# Patient Record
Sex: FEMALE | Race: BLACK OR AFRICAN AMERICAN | NOT HISPANIC OR LATINO | Employment: OTHER | URBAN - METROPOLITAN AREA
[De-identification: names, ages, dates, MRNs, and addresses within clinical notes are randomized per-mention and may not be internally consistent; named-entity substitution may affect disease eponyms.]

---

## 2011-07-05 LAB — HUMAN PAPILLOMAVIRUS (HPV): NORMAL

## 2019-03-03 PROBLEM — E55.9 VITAMIN D DEFICIENCY: Status: ACTIVE | Noted: 2019-03-03

## 2019-03-03 PROBLEM — I10 ESSENTIAL HYPERTENSION: Status: ACTIVE | Noted: 2019-03-03

## 2019-03-03 PROBLEM — H40.9 GLAUCOMA: Status: ACTIVE | Noted: 2019-03-03

## 2019-03-04 ENCOUNTER — OFFICE VISIT (OUTPATIENT)
Dept: FAMILY MEDICINE | Facility: CLINIC | Age: 49
End: 2019-03-04
Payer: MEDICARE

## 2019-03-04 VITALS
TEMPERATURE: 97 F | DIASTOLIC BLOOD PRESSURE: 81 MMHG | HEIGHT: 64 IN | HEART RATE: 74 BPM | OXYGEN SATURATION: 98 % | SYSTOLIC BLOOD PRESSURE: 118 MMHG | BODY MASS INDEX: 38.72 KG/M2 | WEIGHT: 226.81 LBS

## 2019-03-04 DIAGNOSIS — M25.561 CHRONIC PAIN OF RIGHT KNEE: Primary | ICD-10-CM

## 2019-03-04 DIAGNOSIS — H20.823 VKH (VOGT-KOYANAGI-HARADA SYNDROME), BILATERAL: ICD-10-CM

## 2019-03-04 DIAGNOSIS — I10 ESSENTIAL HYPERTENSION: ICD-10-CM

## 2019-03-04 DIAGNOSIS — Q15.9 GLAUCOMA ASSOCIATED WITH CHAMBER ANGLE ANOMALIES, BILATERAL, SEVERE STAGE: ICD-10-CM

## 2019-03-04 DIAGNOSIS — G89.29 CHRONIC PAIN OF RIGHT KNEE: Primary | ICD-10-CM

## 2019-03-04 DIAGNOSIS — R49.0 CHRONIC HOARSENESS: ICD-10-CM

## 2019-03-04 DIAGNOSIS — J30.2 SEASONAL ALLERGIC RHINITIS, UNSPECIFIED TRIGGER: ICD-10-CM

## 2019-03-04 DIAGNOSIS — E55.9 VITAMIN D DEFICIENCY: ICD-10-CM

## 2019-03-04 DIAGNOSIS — H40.53X3 GLAUCOMA ASSOCIATED WITH CHAMBER ANGLE ANOMALIES, BILATERAL, SEVERE STAGE: ICD-10-CM

## 2019-03-04 DIAGNOSIS — Z12.31 SCREENING MAMMOGRAM, ENCOUNTER FOR: ICD-10-CM

## 2019-03-04 PROBLEM — H26.9 CATARACTS, BOTH EYES: Status: ACTIVE | Noted: 2019-03-04

## 2019-03-04 PROCEDURE — 99214 OFFICE O/P EST MOD 30 MIN: CPT | Mod: S$GLB,,, | Performed by: FAMILY MEDICINE

## 2019-03-04 PROCEDURE — 99214 PR OFFICE/OUTPT VISIT, EST, LEVL IV, 30-39 MIN: ICD-10-PCS | Mod: S$GLB,,, | Performed by: FAMILY MEDICINE

## 2019-03-04 RX ORDER — LOSARTAN POTASSIUM 100 MG/1
TABLET ORAL
Refills: 5 | COMMUNITY
Start: 2019-02-15 | End: 2019-06-24 | Stop reason: SDUPTHER

## 2019-03-04 RX ORDER — ERGOCALCIFEROL 1.25 MG/1
CAPSULE ORAL
Refills: 1 | COMMUNITY
Start: 2019-02-15 | End: 2019-03-20 | Stop reason: SDUPTHER

## 2019-03-04 RX ORDER — PREDNISOLONE ACETATE 10 MG/ML
SUSPENSION/ DROPS OPHTHALMIC
Refills: 3 | COMMUNITY
Start: 2019-02-15

## 2019-03-04 RX ORDER — BRIMONIDINE TARTRATE, TIMOLOL MALEATE 2; 5 MG/ML; MG/ML
SOLUTION/ DROPS OPHTHALMIC
Refills: 5 | COMMUNITY
Start: 2019-02-15

## 2019-03-04 NOTE — PROGRESS NOTES
Subjective:       Patient ID: Olga Hartley is a 48 y.o. female.    Chief Complaint: Knee Pain (Right   )    47 yo female with complaint of sore throat and chronic hoearseness that has been ongoing for about 6 months She takes OTC cold meds when she feels like she has a cold.  She does not like to take too many meds because of her vision which is impaired.  She is legally blind and a lot of medications causes her vision to worsen temporarily. She sings a lot and notices that she has been hoarse for several months.  She also complains of right knee pain for 3 months with no known injury or trauma.  She has been taking ibuprofen which helps some but she has difficulty walking on it at times.      Knee Pain    The incident occurred more than 1 week ago. There was no injury mechanism. The pain is present in the right knee. The quality of the pain is described as stabbing and burning. The pain is at a severity of 6/10. The pain is moderate. The pain has been constant since onset. Pertinent negatives include no inability to bear weight, loss of sensation, numbness or tingling. She reports no foreign bodies present. Nothing aggravates the symptoms. She has tried NSAIDs, non-weight bearing and rest for the symptoms. The treatment provided mild relief.   Sinus Problem   This is a chronic problem. The problem has been waxing and waning since onset. There has been no fever. Her pain is at a severity of 0/10. She is experiencing no pain. Associated symptoms include a hoarse voice, sinus pressure, sneezing and a sore throat. Pertinent negatives include no chills, coughing, diaphoresis, ear pain, headaches, neck pain or shortness of breath. Treatments tried: takes Tamara-Seltxer Cold Plus prn.     Review of Systems   Constitutional: Negative for chills, diaphoresis and fever.   HENT: Positive for hoarse voice, sinus pressure, sneezing and sore throat. Negative for ear pain, postnasal drip, rhinorrhea and sinus pain.    Eyes:  Negative for redness.   Respiratory: Negative for cough, chest tightness, shortness of breath and wheezing.    Cardiovascular: Negative for chest pain, palpitations and leg swelling.   Gastrointestinal: Negative for constipation and nausea.   Genitourinary: Negative for difficulty urinating and dysuria.   Musculoskeletal: Positive for arthralgias. Negative for neck pain.   Skin: Negative for rash.   Neurological: Negative for dizziness, tingling, numbness and headaches.       Objective:      Physical Exam   Constitutional: She is oriented to person, place, and time. She appears well-developed and well-nourished.   HENT:   Head: Normocephalic and atraumatic.   Right Ear: Hearing and tympanic membrane normal.   Left Ear: Hearing and tympanic membrane normal.   Nose: Sinus tenderness present. Right sinus exhibits maxillary sinus tenderness. Left sinus exhibits maxillary sinus tenderness.   Mouth/Throat: Oropharynx is clear and moist and mucous membranes are normal.   Eyes: Conjunctivae and EOM are normal. Pupils are equal, round, and reactive to light.       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 Neck supple.   Cardiovascular: Normal rate, regular rhythm and normal heart sounds.   Pulmonary/Chest: Breath sounds normal. She has no wheezes. She has no rales.   Abdominal: Soft. Bowel sounds are normal. She exhibits no distension and no mass. There is no tenderness. There is no guarding.   Musculoskeletal: Normal range of motion. She exhibits no edema or tenderness.   Neurological: She is alert and oriented to person, place, and time. No cranial nerve deficit.   Skin: Skin is warm and dry. No rash noted. No erythema.   Psychiatric: She has a normal mood and affect. Her behavior is normal.       Assessment:       1. Chronic pain of right knee    2. Essential hypertension    3. Vitamin D deficiency    4. VKH (Vogt-Koyanagi-Harada syndrome), bilateral    5. Chronic hoarseness     6. Seasonal allergic rhinitis, unspecified trigger    7. Glaucoma associated with chamber angle anomalies, bilateral, severe stage    8. Screening mammogram, encounter for        Plan:     patient desires a referral to Dr. Cuevas since she has seen him in the past for her knee.  She will be referred to the ENT for chronic sinus and allergies and hoarseness.  She will try an antihistamine OTC but does not want any scripts at this time for concern of making her eyes worse. She will have an order for labs and a mammogram.

## 2019-03-20 RX ORDER — ERGOCALCIFEROL 1.25 MG/1
CAPSULE ORAL
Qty: 12 CAPSULE | Refills: 3 | Status: SHIPPED | OUTPATIENT
Start: 2019-03-20

## 2019-04-08 LAB
CHOLEST SERPL-MSCNC: 179 MG/DL (ref 0–200)
HDLC SERPL-MCNC: 49 MG/DL (ref 35–70)
LDL-C: 106
LDL/HDL RATIO: 3.7 (ref 1–5)
TRIGL SERPL-MCNC: 119 MG/DL (ref 0–150)

## 2019-06-24 RX ORDER — LOSARTAN POTASSIUM 100 MG/1
100 TABLET ORAL DAILY
Qty: 30 TABLET | Refills: 11 | Status: SHIPPED | OUTPATIENT
Start: 2019-06-24

## 2019-10-01 ENCOUNTER — OFFICE VISIT (OUTPATIENT)
Dept: FAMILY MEDICINE | Facility: CLINIC | Age: 49
End: 2019-10-01
Payer: MEDICARE

## 2019-10-01 VITALS
BODY MASS INDEX: 38.24 KG/M2 | DIASTOLIC BLOOD PRESSURE: 89 MMHG | HEART RATE: 84 BPM | OXYGEN SATURATION: 99 % | TEMPERATURE: 99 F | HEIGHT: 64 IN | WEIGHT: 224 LBS | SYSTOLIC BLOOD PRESSURE: 148 MMHG

## 2019-10-01 DIAGNOSIS — I10 ESSENTIAL HYPERTENSION: Primary | ICD-10-CM

## 2019-10-01 DIAGNOSIS — R10.10 PAIN OF UPPER ABDOMEN: ICD-10-CM

## 2019-10-01 PROCEDURE — 99214 PR OFFICE/OUTPT VISIT, EST, LEVL IV, 30-39 MIN: ICD-10-PCS | Mod: S$GLB,,, | Performed by: FAMILY MEDICINE

## 2019-10-01 PROCEDURE — 99214 OFFICE O/P EST MOD 30 MIN: CPT | Mod: S$GLB,,, | Performed by: FAMILY MEDICINE

## 2019-10-01 NOTE — ASSESSMENT & PLAN NOTE
Patient has been encouraged to check her blood pressure regularly and keep taking her current losartan however if her blood pressure is staying elevated that she needs to notify me so we can change her blood pressure medication.

## 2019-10-01 NOTE — PROGRESS NOTES
Subjective:      Patient ID: Olga Hartley is a 49 y.o. female.    Chief Complaint: Follow-up (sick visit)    49-year-old female in for follow-up.  Patient with complaint of abdominal pain nausea increased burping and belching I have been ongoing for at least 2-3 months.  She reports that has been progressively getting worse.  When she starts to eat she gets full and not able to eat as much.  She feels bloated and pain that goes from her stomach to the right side.    She also feels that her blood pressure has been elevated because she has not been feeling well due to her abdominal pain in stomach issues.    Hypertension: Patient here for follow-up of elevated blood pressure. She is not exercising and is adherent to low salt diet.  Blood pressure is not well controlled at home. Cardiac symptoms none. Patient denies claudication, dyspnea, fatigue and palpitations.  Cardiovascular risk factors: hypertension and obesity (BMI >= 30 kg/m2). Use of agents associated with hypertension: none. History of target organ damage: none.    Abdominal Pain   This is a new problem. The current episode started more than 1 month ago. The onset quality is gradual. The problem occurs daily. The problem has been gradually worsening. The pain is located in the epigastric region and RUQ. The pain is at a severity of 5/10. The pain is moderate. The quality of the pain is colicky. The abdominal pain radiates to the right flank. Associated symptoms include belching, nausea and vomiting. Pertinent negatives include no anorexia, arthralgias, constipation, diarrhea, dysuria, fever, flatus, frequency, headaches, hematochezia, hematuria, melena, myalgias or weight loss. The pain is aggravated by belching and eating. The pain is relieved by nothing. She has tried nothing for the symptoms.     Review of Systems   Constitutional: Negative for fever and weight loss.   Gastrointestinal: Positive for abdominal pain, nausea and vomiting. Negative for  "anorexia, constipation, diarrhea, flatus, hematochezia and melena.   Genitourinary: Negative for dysuria, frequency and hematuria.   Musculoskeletal: Negative for arthralgias and myalgias.   Neurological: Negative for headaches.     Medication List with Changes/Refills   Current Medications    COMBIGAN 0.2-0.5 % DROP    PUT  1 DROP IN OU BID    ERGOCALCIFEROL (ERGOCALCIFEROL) 50,000 UNIT CAP    TK 1 C PO Q WK FOR 84 DAYS    LOSARTAN (COZAAR) 100 MG TABLET    Take 1 tablet (100 mg total) by mouth once daily.    PRED FORTE 1 % DRPS    INT 1 GTT IN OD BID      Objective:   BP (!) 148/89 (BP Location: Left arm, Patient Position: Sitting, BP Method: Medium (Automatic))   Pulse 84   Temp 98.5 °F (36.9 °C)   Ht 5' 4" (1.626 m)   Wt 101.6 kg (224 lb)   SpO2 99%   BMI 38.45 kg/m²    Estimated body mass index is 38.45 kg/m² as calculated from the following:    Height as of this encounter: 5' 4" (1.626 m).    Weight as of this encounter: 101.6 kg (224 lb).   Physical Exam  No results found for: WBC, HGB, HCT, PLT, CHOL, TRIG, HDL, LDLDIRECT, ALT, AST, NA, K, CL, CREATININE, BUN, CO2, TSH, PSA, INR, GLUF, HGBA1C, MICROALBUR   Assessment:      Problem List Items Addressed This Visit        Cardiac/Vascular    Essential hypertension - Primary    Relevant Orders    Comprehensive metabolic panel    CBC auto differential    Lipid panel    TSH    Urinalysis, Reflex to Urine Culture Urine, Clean Catch      Other Visit Diagnoses     Pain of upper abdomen        Relevant Orders    Urinalysis, Reflex to Urine Culture Urine, Clean Catch           Plan:     Patient will be given an order for fasting labs and an upper GI series and an abdominal ultrasound for further evaluation.  I will notify her of her test results once I receive them and we will go from there.      "

## 2019-10-01 NOTE — ASSESSMENT & PLAN NOTE
Patient was adamant about not taking any medication for her symptoms but wanted to to have test ran to find out what is causing her symptoms.  I have ordered an upper GI series and of abdominal ultrasound for further evaluation.  She also was given order for fasting labs as well.

## 2019-10-07 DIAGNOSIS — R16.0 LIVER MASSES: Primary | ICD-10-CM

## 2019-10-08 ENCOUNTER — TELEPHONE (OUTPATIENT)
Dept: FAMILY MEDICINE | Facility: CLINIC | Age: 49
End: 2019-10-08

## 2019-10-08 NOTE — TELEPHONE ENCOUNTER
----- Message from Clair Levin sent at 10/8/2019  4:15 PM CDT -----  Contact: patient  Calling for her CT results

## 2019-10-09 DIAGNOSIS — R16.0 LIVER MASSES: Primary | ICD-10-CM

## 2019-10-21 DIAGNOSIS — C79.9 METASTATIC ADENOCARCINOMA: ICD-10-CM

## 2019-10-21 DIAGNOSIS — R10.10 PAIN OF UPPER ABDOMEN: Primary | ICD-10-CM

## 2020-02-20 ENCOUNTER — TELEPHONE (OUTPATIENT)
Dept: FAMILY MEDICINE | Facility: CLINIC | Age: 50
End: 2020-02-20

## 2020-02-20 DIAGNOSIS — C22.1 CHOLANGIOCARCINOMA: Primary | ICD-10-CM

## 2020-02-20 RX ORDER — HYDROCODONE BITARTRATE AND ACETAMINOPHEN 10; 325 MG/1; MG/1
1 TABLET ORAL EVERY 12 HOURS PRN
Qty: 60 TABLET | Refills: 0 | Status: SHIPPED | OUTPATIENT
Start: 2020-02-20 | End: 2020-03-27 | Stop reason: SDUPTHER

## 2020-02-20 NOTE — TELEPHONE ENCOUNTER
MAY BENAVIDES. FROM [facility] IS REQUESTING A CALL BACK FROM YOU. STATES THAT THE PATIENT IS IN A LOT OF PAIN BUT [place] WILL NOT FILL A CONTROLLED SCRIPT FOR PAIN FROM ANOTHER STATE. WOULD LIKE A CALL BACK TO DISCUSS GETTING THE PATIENT A PAIN MANAGEMENT REFERRAL AND/OR A PRESCRIPTION FOR MEDICATION.

## 2020-02-20 NOTE — TELEPHONE ENCOUNTER
Received a phone call from the physician assistant at [facility] who is taking care of this patient.  She has been diagnosed with cholangiocarcinoma with metastasis and has been with increasing pain in the upper abdomen and back and they have been given her tramadol but has not been helping.  I was asked to prescribe her stronger pain medication if possible due to their in [place] and [place] is not allowed to take prescription of narcotic across the state line.  I did speak with patient and she explained to me that the tramadol they have been prescribing is not touching the pain and she is not able to get any sleep and would like to try a stronger pain medication.  I called in hydrocodone 10 mg tablets to be taken every 12 hr or as needed for pain since patient does have a cancer diagnosis.  She will be given a total of 60 tablets with no refill.

## 2020-03-02 ENCOUNTER — TELEPHONE (OUTPATIENT)
Dept: FAMILY MEDICINE | Facility: CLINIC | Age: 50
End: 2020-03-02

## 2020-03-06 ENCOUNTER — OFFICE VISIT (OUTPATIENT)
Dept: FAMILY MEDICINE | Facility: CLINIC | Age: 50
End: 2020-03-06
Payer: MEDICARE

## 2020-03-06 VITALS
DIASTOLIC BLOOD PRESSURE: 91 MMHG | SYSTOLIC BLOOD PRESSURE: 135 MMHG | HEART RATE: 109 BPM | OXYGEN SATURATION: 97 % | HEIGHT: 64 IN | WEIGHT: 193.25 LBS | BODY MASS INDEX: 32.99 KG/M2 | TEMPERATURE: 96 F

## 2020-03-06 DIAGNOSIS — C22.1 CHOLANGIOCARCINOMA: Primary | ICD-10-CM

## 2020-03-06 DIAGNOSIS — I10 ESSENTIAL HYPERTENSION: ICD-10-CM

## 2020-03-06 PROCEDURE — 99212 OFFICE O/P EST SF 10 MIN: CPT | Mod: S$GLB,,, | Performed by: FAMILY MEDICINE

## 2020-03-06 PROCEDURE — 99212 PR OFFICE/OUTPT VISIT, EST, LEVL II, 10-19 MIN: ICD-10-PCS | Mod: S$GLB,,, | Performed by: FAMILY MEDICINE

## 2020-03-06 RX ORDER — ONDANSETRON HYDROCHLORIDE 8 MG/1
8 TABLET, FILM COATED ORAL
COMMUNITY
Start: 2019-12-03

## 2020-03-06 RX ORDER — OMEPRAZOLE 20 MG/1
20 CAPSULE, DELAYED RELEASE ORAL
COMMUNITY
Start: 2020-01-30

## 2020-03-06 RX ORDER — PROCHLORPERAZINE MALEATE 10 MG
TABLET ORAL
COMMUNITY
Start: 2020-02-28

## 2020-03-06 RX ORDER — TRAMADOL HYDROCHLORIDE 50 MG/1
TABLET ORAL
COMMUNITY
Start: 2020-02-17

## 2020-03-06 NOTE — PROGRESS NOTES
Subjective:      Patient ID: Olga Hartley is a 49 y.o. female.    Chief Complaint: Hospital Follow Up    50 yo female with follow up on ER visit from [facility].  She went to the [facility] ER on 2/28/2020.  She went in for nausea and vomiting and she had  not been eating anything for 4 days when she went.  She was found to be dehydrated and was IV fluids and they did labs , EKG, chest xray and a CT scan.  She was discharged from the ER with pain meds and nausea medication.  She has been sleeping good     Review of Systems   Constitutional: Negative for fever.   HENT: Negative for ear pain, postnasal drip, rhinorrhea, sinus pain and sore throat.    Eyes: Negative for redness.   Respiratory: Negative for cough, chest tightness, shortness of breath and wheezing.    Cardiovascular: Negative for chest pain, palpitations and leg swelling.   Gastrointestinal: Positive for abdominal pain and nausea. Negative for constipation, diarrhea and vomiting.   Genitourinary: Negative for difficulty urinating and dysuria.   Musculoskeletal: Negative for arthralgias.   Skin: Negative for rash.   Neurological: Negative for dizziness.     Medication List with Changes/Refills   Current Medications    COMBIGAN 0.2-0.5 % DROP    PUT  1 DROP IN OU BID    ERGOCALCIFEROL (ERGOCALCIFEROL) 50,000 UNIT CAP    TK 1 C PO Q WK FOR 84 DAYS    HYDROCODONE-ACETAMINOPHEN (NORCO)  MG PER TABLET    Take 1 tablet by mouth every 12 (twelve) hours as needed for Pain.    LOSARTAN (COZAAR) 100 MG TABLET    Take 1 tablet (100 mg total) by mouth once daily.    OMEPRAZOLE (PRILOSEC) 20 MG CAPSULE    Take 20 mg by mouth.    ONDANSETRON (ZOFRAN) 8 MG TABLET    Take 8 mg by mouth.    PRED FORTE 1 % DRPS    INT 1 GTT IN OD BID    PROCHLORPERAZINE (COMPAZINE) 10 MG TABLET        TRAMADOL (ULTRAM) 50 MG TABLET          Objective:   BP (!) 135/91 (BP Location: Left arm, Patient Position: Sitting, BP Method: Large (Automatic))   Pulse 109   Temp 96.4 °F  "(35.8 °C)   Ht 5' 4" (1.626 m)   Wt 87.7 kg (193 lb 4 oz)   SpO2 97%   BMI 33.17 kg/m²    Estimated body mass index is 33.17 kg/m² as calculated from the following:    Height as of this encounter: 5' 4" (1.626 m).    Weight as of this encounter: 87.7 kg (193 lb 4 oz).   Physical Exam   Constitutional: She is oriented to person, place, and time. She appears well-developed and well-nourished.   HENT:   Head: Normocephalic and atraumatic.   Right Ear: Hearing and tympanic membrane normal.   Left Ear: Hearing and tympanic membrane normal.   Nose: Nose normal.   Mouth/Throat: Uvula is midline, oropharynx is clear and moist and mucous membranes are normal.   Eyes: Pupils are equal, round, and reactive to light. Conjunctivae and EOM are normal.   Neck: Normal range of motion. Neck supple.   Cardiovascular: Normal rate, regular rhythm, normal heart sounds and intact distal pulses.   Pulmonary/Chest: Effort normal and breath sounds normal. She has no wheezes. She has no rales.   Abdominal: She exhibits distension. She exhibits no mass. There is tenderness. There is guarding.   Musculoskeletal: Normal range of motion. She exhibits no edema or tenderness.   Neurological: She is alert and oriented to person, place, and time. No cranial nerve deficit.   Skin: Skin is warm and dry. No rash noted. No erythema.   Psychiatric: She has a normal mood and affect. Her speech is normal and behavior is normal. Judgment and thought content normal. Cognition and memory are normal.   Nursing note and vitals reviewed.    No results found for: WBC, HGB, HCT, PLT, CHOL, TRIG, HDL, LDLDIRECT, ALT, AST, NA, K, CL, CREATININE, BUN, CO2, TSH, PSA, INR, GLUF, HGBA1C, MICROALBUR   Assessment:      Problem List Items Addressed This Visit        Cardiac/Vascular    Essential hypertension       Oncology    Cholangiocarcinoma - Primary           Plan:   Hypertension has been chronic and controlled on losartan.  Her cholangiocarcinoma is followed by " [facility] in [facility].  She is awaiting a new medication and treatment for her cancer.  The symptoms caused to her to go to the emergency room on last week is resolved.  I will have her to sign a release to get her medical records to send to her providers in [place].

## 2020-03-27 DIAGNOSIS — C22.1 CHOLANGIOCARCINOMA: ICD-10-CM

## 2020-03-27 RX ORDER — HYDROCODONE BITARTRATE AND ACETAMINOPHEN 10; 325 MG/1; MG/1
1 TABLET ORAL EVERY 12 HOURS PRN
Qty: 60 TABLET | Refills: 0 | Status: SHIPPED | OUTPATIENT
Start: 2020-03-27

## 2020-03-27 NOTE — TELEPHONE ENCOUNTER
----- Message from Clair Levin sent at 3/27/2020  1:17 PM CDT -----  Contact: patient  Needs a refill on Hydrocodone  Ph[facility]

## 2020-04-28 ENCOUNTER — PATIENT OUTREACH (OUTPATIENT)
Dept: ADMINISTRATIVE | Facility: HOSPITAL | Age: 50
End: 2020-04-28

## 2020-04-28 NOTE — PROGRESS NOTES
Addendum:   Pap smear with hpv scanned into chart and sent to [facility] from [facility].    Chart review, no results found in [facility] or [facility]. Mammogram and lipid panel scanned into chart from legacy documents and sent to [facility].

## 2020-07-08 ENCOUNTER — TELEPHONE (OUTPATIENT)
Dept: INTERNAL MEDICINE | Facility: CLINIC | Age: 50
End: 2020-07-08

## 2020-07-08 NOTE — TELEPHONE ENCOUNTER
Spoke with Mr ba and he says that insurance asked who was his wife's primary care doctor, which was Dr Guan. Not exactly sure what the insurance needs, but he will have them call office.

## 2020-07-08 NOTE — TELEPHONE ENCOUNTER
----- Message from Kalyn Cline sent at 7/8/2020 12:49 PM CDT -----  Patient  ( mejia ) called in regards to his wife passing away and wanted to speak to an nurse about her insurance , please call back at [phone].    Patient passed away on 6/16/2020.        Thanks,  Kalyn Cline